# Patient Record
Sex: MALE
[De-identification: names, ages, dates, MRNs, and addresses within clinical notes are randomized per-mention and may not be internally consistent; named-entity substitution may affect disease eponyms.]

---

## 2022-08-18 ENCOUNTER — NURSE TRIAGE (OUTPATIENT)
Dept: OTHER | Facility: CLINIC | Age: 37
End: 2022-08-18

## 2022-08-18 NOTE — TELEPHONE ENCOUNTER
Subjective: Caller states \"Im pretty sure I have a torn muscle\"     Current Symptoms: Middle of calf on R leg has pain. Was playing soccer and jumped to change positions, went down to the ground and he heard a pop. Calf muscle looks like it may not be attached. When flexes his toes the calf muscle moves upward. Some weakness in R leg. R calf is cold compared to his L calf. Limping. Walking on his heel. If walks normally, it causing spasming in his calf. Onset: 1 day ago; unchanged    Associated Symptoms: reduced activity    Pain Severity: 0/10; spasming; waxing and waning, mild    What has been tried:     LMP: NA Pregnant: NA    Recommended disposition: Go to ED/UCC Now (Or to Office with PCP Approval)    Care advice provided, patient verbalizes understanding; denies any other questions or concerns; instructed to call back for any new or worsening symptoms. Patient/caller agrees to proceed to   Emergency Department    This triage is a result of a call to [address] [place] [address]. Please do not respond to the triage nurse through this encounter. Any subsequent communication should be directly with the patient.     Reason for Disposition   Sounds like a serious injury to the triager    Protocols used: Leg Injury-ADULT-[place]